# Patient Record
Sex: FEMALE | Race: AMERICAN INDIAN OR ALASKA NATIVE
[De-identification: names, ages, dates, MRNs, and addresses within clinical notes are randomized per-mention and may not be internally consistent; named-entity substitution may affect disease eponyms.]

---

## 2018-08-23 ENCOUNTER — HOSPITAL ENCOUNTER (EMERGENCY)
Dept: HOSPITAL 46 - ED | Age: 53
Discharge: ANOTHER HOSPITAL | End: 2018-08-23
Payer: COMMERCIAL

## 2018-08-23 VITALS — BODY MASS INDEX: 25.77 KG/M2 | WEIGHT: 180.01 LBS | HEIGHT: 70 IN

## 2018-08-23 DIAGNOSIS — S60.552A: Primary | ICD-10-CM
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
## 2018-11-05 ENCOUNTER — HOSPITAL ENCOUNTER (EMERGENCY)
Dept: HOSPITAL 46 - ED | Age: 53
Discharge: HOME | End: 2018-11-05
Payer: COMMERCIAL

## 2018-11-05 VITALS — HEIGHT: 70 IN | BODY MASS INDEX: 25.77 KG/M2 | WEIGHT: 180.01 LBS

## 2018-11-05 DIAGNOSIS — Z87.891: ICD-10-CM
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
## 2018-11-05 DIAGNOSIS — S61.212A: Primary | ICD-10-CM

## 2020-06-14 NOTE — XMS
PreManage Notification: JESUSITA NEIL MRN:[id number]
 
Security Information
 
Security Events
No recent Security Events currently on file
 
 
 
CRITERIA MET
------------
- [facility] - 2 Visits in 30 Days
 
 
CARE PROVIDERS
There are no care providers on record at this time.
 
Kathleen has no Care Guidelines for this patient.
 
EVAN VISIT COUNT (12 MO.)
-------------------------------------------------------------------------------------
3 [facility]
-------------------------------------------------------------------------------------
TOTAL 3
-------------------------------------------------------------------------------------
NOTE: Visits indicate total known visits.
 
ED/C VISIT TRACKING (12 MO.)
-------------------------------------------------------------------------------------
[facility]
 
[facility]
 
COMPLAINT:
- BUCKED OFF HORSE, LEFT SIDE HURTS
-------------------------------------------------------------------------------------
06/02/2020 14:50
[facility]
 
TYPE: Emergency
 
COMPLAINT:
- HEAD PAIN, DIARRHEA
 
DIAGNOSES:
- Dermatitis, unspecified
- Noninfective gastroenteritis and colitis, unspecified
- Myalgia, unspecified site
- Nicotine dependence, unspecified, uncomplicated
- Diarrhea, unspecified
-------------------------------------------------------------------------------------
09/07/2019 17:05
[facility] [place]
 
TYPE: Emergency
 
COMPLAINT:
- BACK PAIN, INJ
 
 
DIAGNOSES:
- Animal-rider injured by fall from or being thrown from horse
- Strain of muscle, fascia and tendon of lower back, initial en
- Low back pain
- Nicotine dependence, unspecified, uncomplicated
-------------------------------------------------------------------------------------
 
 
INPATIENT VISIT TRACKING (12 MO.)
No inpatient visits to display in this time frame
 
[email or web address]

## 2021-08-16 NOTE — NUR
PT UP TO BATHROOM WITH RN ASSIST, STEADY GAIT. PT ABLE TO VOID 400 MLS YELLOW
URINE WITH NO PROBLEMS. PT BACK TO DS RM 2 WITH STEADY GAIT. PT ENCOURAGED TO
OPEN CURTAIN WHEN DRESSED, SISTER IN ROOM AT BEDSIDE.

## 2021-08-16 NOTE — EKG
[facility]
                                    [address]
                                  [place]  [address]
_________________________________________________________________________________________
                                                                 Signed   
 
 
Sinus rhythm with 1st degree AV block
Otherwise normal ECG
No previous ECGs available
Confirmed by SHELBY ALVA MD ([id number]) on 8/16/2021 4:43:42 PM
 
 
 
 
 
 
 
 
 
 
 
 
 
 
 
 
 
 
 
 
 
 
 
 
 
 
 
 
 
 
 
 
 
 
 
 
 
 
 
 
 
    Electronically Signed By: SHELBY ALVA MD  08/16/21 1643
_________________________________________________________________________________________
PATIENT NAME:     JESUSITA NEIL                     
MEDICAL RECORD #: [id number]                     Electrocardiogram             
          ACCT #: [id number]  
DATE OF BIRTH:   [birth year]                                       
PHYSICIAN:   SHELBY ALVA MD                   REPORT #: [id number]
REPORT IS CONFIDENTIAL AND NOT TO BE RELEASED WITHOUT AUTHORIZATION

## 2021-08-16 NOTE — NUR
1550: PT ARRIVES TO DS RM 2 FROM PACU AWAKE AND ALERRT. PT DENIES ANY NAUSEA
OR PAIN, STATES LEFT ARM "IS PRETTY DEAD." DC CRITERIA EXPLAINED TO PT.
PROVIDED ICED WATER AND PUDDING. CALL LIGHT WITHIN REACH.

## 2021-08-16 NOTE — NUR
08/16/21 1539 Alicia,Marcy
1508 PT ARRIVED TO PACU ON 6L VIA MASK, RESP EVEN AND UNLABORED. VSS.
 
1515 PT WAKES AND IS REORIENTED TO PACU, O2 REMOVED. PT DENIES PAIN
AND NAUSEA. HOB INCREASED SLIGHTLY PER REQUEST. PT TEARFUL ABOUT "NOT
BEING ABLE TO RIDE HER HORSE." PT ABLE TO MOVE HER FINGERS AND REPORTS
NUMBNESS. BLOCK INFORMATION GIVEN.

## 2021-08-16 NOTE — NUR
1715: PT DRESSED AND PULLS CURTAIN OPEN. DC INSTRUCTIONS PRESENTED VERBALLY
AND WRITTEN, PT STATES AN UNDERSTANDING. PT AWARE MEDICATIONS ESCRIPTED TO
[facility] AND NOT [facility]. PT DC FROM DS RM 2 VIA WC TO SISTER
VEHICLE AT FRONT ENTRANCE OF HOSPITAL TO HOME.

## 2021-08-18 NOTE — OR
[facility]
                                    [address] [place], [place]  [address]
_________________________________________________________________________________________
                                                                 Signed   
 
 
DATE OF OPERATION:
08/16/2021
 
SURGEON:
Ailyn Jones MD
 
PREOPERATIVE DIAGNOSIS:
Displaced greater tuberosity fracture, left.
 
POSTOPERATIVE DIAGNOSIS:
Displaced greater tuberosity fracture, left.
 
PROCEDURE PERFORMED:
Open reduction and internal fixation of greater tuberosity.
 
ASSISTANT:
Laney Zhu PA-C.  Laney was present and critical for all portions of
procedure. 
 
ANESTHESIA:
General.
 
BLOOD LOSS:
100 mL.
 
IMPLANTS:
A 4.0 screw with a washer into FiberTape.
 
BRIEF HISTORY:
Jesusita is a 56-year-old female, who suffered a ground level fall fracturing her
tuberosity.  There was no associated surgical neck fracture, but the tuberosity was
unstable and displaced.  Risks and benefits of operative treatment discussed with her
and she elected to proceed. 
 
DESCRIPTION OF PROCEDURE:
Once consent was obtained, she was taken to the operating room.  After adequate
anesthesia, she was placed in a beach chair position.  All downside pressure points were
well padded.  The shoulder was then prepped and draped in a standard sterile fashion.
Standard deltopectoral approach was taken through skin and subcutaneous tissue.  The
interval was identified, although we could not identify the cephalic vein.  The interval
was opened up and the deltoid was mobilized laterally.  This allowed visualization of
the fracture and this was mobilized superiorly.  However, the fragments were fairly
 
    Electronically Signed By: AILYN JONES MD  08/18/21 1621
_________________________________________________________________________________________
PATIENT NAME:     JESUSITA NEIL                     
MEDICAL RECORD #: [id number]            OPERATIVE REPORT              
          ACCT #: [id number]  
DATE OF BIRTH:   [birth year]            REPORT #: [id number]      
PHYSICIAN:        AILYN JONES MD              
PCP:              [facility]             
REPORT IS CONFIDENTIAL AND NOT TO BE RELEASED WITHOUT AUTHORIZATION
 
 
                                  [facility]
                                    [address], [place]  [address]
_________________________________________________________________________________________
                                                                 Signed   
 
 
friable and unable to really get good reduction and hold.  We were able to get it back
into reasonably good position.  While holding the reduction, we were able to put a 4.0
screw in the midportion of the tuberosity.  Two FiberTape sutures were placed in tendon
grasping fashion through the supraspinatus and infraspinatus and wrapped around the
screw and tied.  Once this was accomplished, final screw tightening was undertaken.
Excellent reduction and neutralization of these rotator cuff forces was obtained.  The
suture ends were cut.  The wound was copiously irrigated with normal saline and closed
with 2-0 Stratafix for the deltopectoral interval, 2-0 Stratafix for the subcu tissue,
and staples for the skin.  Wound was dressed with Acticoat dressing and she was placed
in a pillow splint and taken to the recovery room in satisfactory condition.  All
sponge, needle, and instrument counts were correct. 
 
 
 
            ________________________________________
            MD ZENOBIA Arboleda/MAHSAL
Job #:  [id number]
DD:  08/16/2021 15:10:29
DT:  08/16/2021 16:12:31
 
 
Copies:                                
~
 
 
 
 
 
 
 
 
 
 
 
 
 
 
 
 
 
    Electronically Signed By: AILYN JONES MD  08/18/21 1621
_________________________________________________________________________________________
PATIENT NAME:     JESUSITA NEIL                     
MEDICAL RECORD #: [id number]            OPERATIVE REPORT              
          ACCT #: [id number]  
DATE OF BIRTH:   [birth year]            REPORT #: [id number]      
PHYSICIAN:        AILYN JONES MD              
PCP:              [facility]             
REPORT IS CONFIDENTIAL AND NOT TO BE RELEASED WITHOUT AUTHORIZATION

## 2021-11-18 NOTE — NUR
RAPID COVID TEST DONE PER DR ORDER. COVID TEST COLLECTED FROM BOTH NARES. LEFT
SIDE HAD OBSTRUCTION. RIGHT SIDE GOOD. PT TOLERATED WELL. No pallor, no cervical/supraclavicular/inguinal adenopathy.  No splenomegaly